# Patient Record
Sex: FEMALE | Race: WHITE
[De-identification: names, ages, dates, MRNs, and addresses within clinical notes are randomized per-mention and may not be internally consistent; named-entity substitution may affect disease eponyms.]

---

## 2018-04-17 ENCOUNTER — HOSPITAL ENCOUNTER (INPATIENT)
Dept: HOSPITAL 26 - MED | Age: 83
LOS: 2 days | Discharge: ANOTHER HOSPITAL | DRG: 871 | End: 2018-04-19
Attending: FAMILY MEDICINE | Admitting: FAMILY MEDICINE
Payer: COMMERCIAL

## 2018-04-17 VITALS — HEIGHT: 65 IN | BODY MASS INDEX: 25.66 KG/M2 | WEIGHT: 154 LBS

## 2018-04-17 VITALS — DIASTOLIC BLOOD PRESSURE: 55 MMHG | SYSTOLIC BLOOD PRESSURE: 111 MMHG

## 2018-04-17 VITALS — DIASTOLIC BLOOD PRESSURE: 49 MMHG | SYSTOLIC BLOOD PRESSURE: 112 MMHG

## 2018-04-17 DIAGNOSIS — J96.01: ICD-10-CM

## 2018-04-17 DIAGNOSIS — Z88.0: ICD-10-CM

## 2018-04-17 DIAGNOSIS — Z96.652: ICD-10-CM

## 2018-04-17 DIAGNOSIS — H54.8: ICD-10-CM

## 2018-04-17 DIAGNOSIS — E43: ICD-10-CM

## 2018-04-17 DIAGNOSIS — E11.69: ICD-10-CM

## 2018-04-17 DIAGNOSIS — E83.42: ICD-10-CM

## 2018-04-17 DIAGNOSIS — E11.319: ICD-10-CM

## 2018-04-17 DIAGNOSIS — D53.9: ICD-10-CM

## 2018-04-17 DIAGNOSIS — Z79.84: ICD-10-CM

## 2018-04-17 DIAGNOSIS — R65.20: ICD-10-CM

## 2018-04-17 DIAGNOSIS — E87.1: ICD-10-CM

## 2018-04-17 DIAGNOSIS — H91.90: ICD-10-CM

## 2018-04-17 DIAGNOSIS — N17.0: ICD-10-CM

## 2018-04-17 DIAGNOSIS — K80.50: ICD-10-CM

## 2018-04-17 DIAGNOSIS — Z87.440: ICD-10-CM

## 2018-04-17 DIAGNOSIS — J40: ICD-10-CM

## 2018-04-17 DIAGNOSIS — D68.59: ICD-10-CM

## 2018-04-17 DIAGNOSIS — A41.9: Primary | ICD-10-CM

## 2018-04-17 DIAGNOSIS — J44.9: ICD-10-CM

## 2018-04-17 DIAGNOSIS — K85.90: ICD-10-CM

## 2018-04-17 DIAGNOSIS — K21.9: ICD-10-CM

## 2018-04-17 DIAGNOSIS — E11.65: ICD-10-CM

## 2018-04-17 DIAGNOSIS — E78.5: ICD-10-CM

## 2018-04-17 DIAGNOSIS — E11.51: ICD-10-CM

## 2018-04-17 DIAGNOSIS — E86.0: ICD-10-CM

## 2018-04-17 DIAGNOSIS — J18.9: ICD-10-CM

## 2018-04-17 DIAGNOSIS — Z79.899: ICD-10-CM

## 2018-04-17 DIAGNOSIS — E02: ICD-10-CM

## 2018-04-17 DIAGNOSIS — E78.2: ICD-10-CM

## 2018-04-17 DIAGNOSIS — I10: ICD-10-CM

## 2018-04-17 LAB
ALBUMIN FLD-MCNC: 2.5 G/DL (ref 3.4–5)
AMYLASE SERPL-CCNC: 751 U/L (ref 25–115)
ANION GAP SERPL CALCULATED.3IONS-SCNC: 16.5 MMOL/L (ref 8–16)
AST SERPL-CCNC: 169 U/L (ref 15–37)
BILIRUB SERPL-MCNC: 10 MG/DL (ref 0–1)
BUN SERPL-MCNC: 33 MG/DL (ref 7–18)
CHLORIDE SERPL-SCNC: 96 MMOL/L (ref 98–107)
CHOLEST/HDLC SERPL: 13.4 {RATIO} (ref 1–4.5)
CO2 SERPL-SCNC: 23.2 MMOL/L (ref 21–32)
CREAT SERPL-MCNC: 1.3 MG/DL (ref 0.6–1.3)
ERYTHROCYTE [DISTWIDTH] IN BLOOD BY AUTOMATED COUNT: 15.1 % (ref 11.6–13.7)
GFR SERPL CREATININE-BSD FRML MDRD: (no result) ML/MIN (ref 90–?)
GLUCOSE SERPL-MCNC: 288 MG/DL (ref 74–106)
HCT VFR BLD AUTO: 31.5 % (ref 36–48)
HDLC SERPL-MCNC: 10 MG/DL (ref 40–60)
HGB BLD-MCNC: 10.9 G/DL (ref 12–16)
LDLC SERPL CALC-MCNC: 105 MG/DL (ref 60–100)
LIPASE SERPL-CCNC: 5678 U/L (ref 73–393)
LYMPHOCYTES NFR BLD MANUAL: 3 % (ref 20–46)
MAGNESIUM SERPL-MCNC: 1.9 MG/DL (ref 1.8–2.4)
MCH RBC QN AUTO: 36 PG (ref 27–31)
MCHC RBC AUTO-ENTMCNC: 35 G/DL (ref 33–37)
MCV RBC AUTO: 102.7 FL (ref 80–94)
MONOCYTES NFR BLD MANUAL: 7 % (ref 5–12)
PHOSPHATE SERPL-MCNC: 2.2 MG/DL (ref 2.5–4.9)
PLATELET # BLD AUTO: 201 K/UL (ref 140–450)
POTASSIUM SERPL-SCNC: 4.7 MMOL/L (ref 3.5–5.1)
PROTHROMBIN TIME: 13.3 SECS (ref 10.8–13.4)
RBC # BLD AUTO: 3.06 MIL/UL (ref 4.2–5.4)
SODIUM SERPL-SCNC: 131 MMOL/L (ref 136–145)
T4 FREE SERPL-MCNC: 1.35 NG/DL (ref 0.76–1.46)
TRIGL SERPL-MCNC: 98 MG/DL (ref 30–150)
TSH SERPL DL<=0.05 MIU/L-ACNC: 6.87 UIU/ML (ref 0.34–3.74)
WBC # BLD AUTO: 20.2 K/UL (ref 4.8–10.8)

## 2018-04-17 RX ADMIN — METRONIDAZOLE SCH MLS/HR: 500 SOLUTION INTRAVENOUS at 23:08

## 2018-04-18 VITALS — DIASTOLIC BLOOD PRESSURE: 34 MMHG | SYSTOLIC BLOOD PRESSURE: 105 MMHG

## 2018-04-18 VITALS — DIASTOLIC BLOOD PRESSURE: 39 MMHG | SYSTOLIC BLOOD PRESSURE: 98 MMHG

## 2018-04-18 VITALS — SYSTOLIC BLOOD PRESSURE: 110 MMHG | DIASTOLIC BLOOD PRESSURE: 54 MMHG

## 2018-04-18 VITALS — SYSTOLIC BLOOD PRESSURE: 104 MMHG | DIASTOLIC BLOOD PRESSURE: 36 MMHG

## 2018-04-18 VITALS — DIASTOLIC BLOOD PRESSURE: 52 MMHG | SYSTOLIC BLOOD PRESSURE: 116 MMHG

## 2018-04-18 VITALS — DIASTOLIC BLOOD PRESSURE: 43 MMHG | SYSTOLIC BLOOD PRESSURE: 103 MMHG

## 2018-04-18 LAB
ANION GAP SERPL CALCULATED.3IONS-SCNC: 12.2 MMOL/L (ref 8–16)
APPEARANCE UR: (no result)
BILIRUB UR QL STRIP: (no result)
BUN SERPL-MCNC: 24 MG/DL (ref 7–18)
CHLORIDE SERPL-SCNC: 102 MMOL/L (ref 98–107)
CO2 SERPL-SCNC: 23 MMOL/L (ref 21–32)
COLOR UR: (no result)
CREAT SERPL-MCNC: 1 MG/DL (ref 0.6–1.3)
ERYTHROCYTE [DISTWIDTH] IN BLOOD BY AUTOMATED COUNT: 14.7 % (ref 11.6–13.7)
GFR SERPL CREATININE-BSD FRML MDRD: (no result) ML/MIN (ref 90–?)
GLUCOSE SERPL-MCNC: 242 MG/DL (ref 74–106)
GLUCOSE UR STRIP-MCNC: NEGATIVE MG/DL
HCT VFR BLD AUTO: 28.3 % (ref 36–48)
HGB BLD-MCNC: 9.6 G/DL (ref 12–16)
HGB UR QL STRIP: (no result)
IRON SERPL-MCNC: 17 UG/DL (ref 35–150)
LEUKOCYTE ESTERASE UR QL STRIP: NEGATIVE
LYMPHOCYTES NFR BLD MANUAL: 4 % (ref 20–46)
MAGNESIUM SERPL-MCNC: 1.7 MG/DL (ref 1.8–2.4)
MCH RBC QN AUTO: 35 PG (ref 27–31)
MCHC RBC AUTO-ENTMCNC: 34 G/DL (ref 33–37)
MCV RBC AUTO: 103.8 FL (ref 80–94)
MONOCYTES NFR BLD MANUAL: 8 % (ref 5–12)
NITRITE UR QL STRIP: NEGATIVE
PH UR STRIP: 5.5 [PH] (ref 5–9)
PHOSPHATE SERPL-MCNC: 2.9 MG/DL (ref 2.5–4.9)
PLATELET # BLD AUTO: 194 K/UL (ref 140–450)
POTASSIUM SERPL-SCNC: 4.2 MMOL/L (ref 3.5–5.1)
RBC # BLD AUTO: 2.73 MIL/UL (ref 4.2–5.4)
RBC #/AREA URNS HPF: (no result) /HPF (ref 0–5)
SODIUM SERPL-SCNC: 133 MMOL/L (ref 136–145)
TIBC SERPL-MCNC: 148 UG/DL (ref 250–450)
WBC # BLD AUTO: 20.6 K/UL (ref 4.8–10.8)
WBC,URINE: (no result) /HPF (ref 0–5)

## 2018-04-18 RX ADMIN — Medication SCH DEV: at 06:31

## 2018-04-18 RX ADMIN — SODIUM CHLORIDE SCH MLS/HR: 9 INJECTION, SOLUTION INTRAVENOUS at 00:19

## 2018-04-18 RX ADMIN — IPRATROPIUM BROMIDE AND ALBUTEROL SULFATE SCH ML: .5; 3 SOLUTION RESPIRATORY (INHALATION) at 20:32

## 2018-04-18 RX ADMIN — Medication SCH DEV: at 21:14

## 2018-04-18 RX ADMIN — Medication SCH EACH: at 08:31

## 2018-04-18 RX ADMIN — Medication SCH DEV: at 17:15

## 2018-04-18 RX ADMIN — Medication SCH DEV: at 12:04

## 2018-04-18 RX ADMIN — IPRATROPIUM BROMIDE AND ALBUTEROL SULFATE SCH ML: .5; 3 SOLUTION RESPIRATORY (INHALATION) at 01:30

## 2018-04-18 RX ADMIN — IPRATROPIUM BROMIDE AND ALBUTEROL SULFATE SCH ML: .5; 3 SOLUTION RESPIRATORY (INHALATION) at 14:36

## 2018-04-18 RX ADMIN — SODIUM CHLORIDE SCH MLS/HR: 9 INJECTION, SOLUTION INTRAVENOUS at 20:00

## 2018-04-18 RX ADMIN — PANTOPRAZOLE SODIUM SCH MG: 40 TABLET, DELAYED RELEASE ORAL at 08:31

## 2018-04-18 RX ADMIN — METRONIDAZOLE SCH MLS/HR: 500 SOLUTION INTRAVENOUS at 04:21

## 2018-04-18 RX ADMIN — SODIUM CHLORIDE SCH MLS/HR: 9 INJECTION, SOLUTION INTRAVENOUS at 21:08

## 2018-04-18 RX ADMIN — SODIUM CHLORIDE SCH MLS/HR: 9 INJECTION, SOLUTION INTRAVENOUS at 08:41

## 2018-04-18 RX ADMIN — METRONIDAZOLE SCH MLS/HR: 500 SOLUTION INTRAVENOUS at 12:27

## 2018-04-18 RX ADMIN — METRONIDAZOLE SCH MLS/HR: 500 SOLUTION INTRAVENOUS at 21:14

## 2018-04-18 RX ADMIN — DOCUSATE SODIUM SCH MG: 100 CAPSULE, LIQUID FILLED ORAL at 21:14

## 2018-04-18 RX ADMIN — DOCUSATE SODIUM SCH MG: 100 CAPSULE, LIQUID FILLED ORAL at 08:31

## 2018-04-18 RX ADMIN — IPRATROPIUM BROMIDE AND ALBUTEROL SULFATE SCH ML: .5; 3 SOLUTION RESPIRATORY (INHALATION) at 06:59

## 2018-04-19 VITALS — DIASTOLIC BLOOD PRESSURE: 52 MMHG | SYSTOLIC BLOOD PRESSURE: 121 MMHG

## 2018-04-19 VITALS — DIASTOLIC BLOOD PRESSURE: 39 MMHG | SYSTOLIC BLOOD PRESSURE: 97 MMHG

## 2018-04-19 VITALS — SYSTOLIC BLOOD PRESSURE: 129 MMHG | DIASTOLIC BLOOD PRESSURE: 44 MMHG

## 2018-04-19 VITALS — SYSTOLIC BLOOD PRESSURE: 108 MMHG | DIASTOLIC BLOOD PRESSURE: 42 MMHG

## 2018-04-19 VITALS — SYSTOLIC BLOOD PRESSURE: 79 MMHG | DIASTOLIC BLOOD PRESSURE: 37 MMHG

## 2018-04-19 VITALS — SYSTOLIC BLOOD PRESSURE: 90 MMHG | DIASTOLIC BLOOD PRESSURE: 37 MMHG

## 2018-04-19 LAB
ALBUMIN FLD-MCNC: 1.5 G/DL (ref 3.4–5)
ANION GAP SERPL CALCULATED.3IONS-SCNC: 12.8 MMOL/L (ref 8–16)
AST SERPL-CCNC: 132 U/L (ref 15–37)
BASOPHILS # BLD AUTO: 0 K/UL (ref 0–0.22)
BASOPHILS NFR BLD AUTO: 0.2 % (ref 0–2)
BILIRUB DIRECT SERPL-MCNC: 7.6 MG/DL (ref 0–0.3)
BILIRUB SERPL-MCNC: 8.5 MG/DL (ref 0–1)
BUN SERPL-MCNC: 27 MG/DL (ref 7–18)
CHLORIDE SERPL-SCNC: 107 MMOL/L (ref 98–107)
CO2 SERPL-SCNC: 20 MMOL/L (ref 21–32)
CREAT SERPL-MCNC: 1.3 MG/DL (ref 0.6–1.3)
EOSINOPHIL # BLD AUTO: 0 K/UL (ref 0–0.4)
EOSINOPHIL NFR BLD AUTO: 0 % (ref 0–4)
ERYTHROCYTE [DISTWIDTH] IN BLOOD BY AUTOMATED COUNT: 15.5 % (ref 11.6–13.7)
FERRITIN SERPL-MCNC: 520 NG/ML (ref 15–150)
FOLATE SERPL-MCNC: 7.7 NG/ML (ref 3–?)
GFR SERPL CREATININE-BSD FRML MDRD: (no result) ML/MIN (ref 90–?)
GLUCOSE SERPL-MCNC: 111 MG/DL (ref 74–106)
HCT VFR BLD AUTO: 24.5 % (ref 36–48)
HGB BLD-MCNC: 8.3 G/DL (ref 12–16)
LIPASE SERPL-CCNC: 1091 U/L (ref 73–393)
LYMPHOCYTES # BLD AUTO: 0.6 K/UL (ref 2.5–16.5)
LYMPHOCYTES NFR BLD AUTO: 3.7 % (ref 20.5–51.1)
MAGNESIUM SERPL-MCNC: 1.7 MG/DL (ref 1.8–2.4)
MCH RBC QN AUTO: 36 PG (ref 27–31)
MCHC RBC AUTO-ENTMCNC: 34 G/DL (ref 33–37)
MCV RBC AUTO: 105.5 FL (ref 80–94)
MONOCYTES # BLD AUTO: 1.2 K/UL (ref 0.8–1)
MONOCYTES NFR BLD AUTO: 6.6 % (ref 1.7–9.3)
NEUTROPHILS # BLD AUTO: 15.7 K/UL (ref 1.8–7.7)
NEUTROPHILS NFR BLD AUTO: 89.5 % (ref 42.2–75.2)
PHOSPHATE SERPL-MCNC: 2.8 MG/DL (ref 2.5–4.9)
PLATELET # BLD AUTO: 168 K/UL (ref 140–450)
POTASSIUM SERPL-SCNC: 3.8 MMOL/L (ref 3.5–5.1)
RBC # BLD AUTO: 2.32 MIL/UL (ref 4.2–5.4)
SODIUM SERPL-SCNC: 136 MMOL/L (ref 136–145)
TRANSFERRIN SERPL-MCNC: 121 MG/DL (ref 200–370)
VIT B12 SERPL-MCNC: 1275 PG/ML (ref 211–946)
WBC # BLD AUTO: 17.5 K/UL (ref 4.8–10.8)

## 2018-04-19 RX ADMIN — SODIUM CHLORIDE SCH MLS/HR: 9 INJECTION, SOLUTION INTRAVENOUS at 05:43

## 2018-04-19 RX ADMIN — Medication SCH DEV: at 06:34

## 2018-04-19 RX ADMIN — Medication SCH DEV: at 16:30

## 2018-04-19 RX ADMIN — SODIUM CHLORIDE SCH MLS/HR: 9 INJECTION, SOLUTION INTRAVENOUS at 16:00

## 2018-04-19 RX ADMIN — Medication SCH DEV: at 11:30

## 2018-04-19 RX ADMIN — IPRATROPIUM BROMIDE AND ALBUTEROL SULFATE SCH ML: .5; 3 SOLUTION RESPIRATORY (INHALATION) at 18:00

## 2018-04-19 RX ADMIN — METRONIDAZOLE SCH MLS/HR: 500 SOLUTION INTRAVENOUS at 17:05

## 2018-04-19 RX ADMIN — Medication SCH EACH: at 09:11

## 2018-04-19 RX ADMIN — IPRATROPIUM BROMIDE AND ALBUTEROL SULFATE SCH ML: .5; 3 SOLUTION RESPIRATORY (INHALATION) at 13:33

## 2018-04-19 RX ADMIN — PANTOPRAZOLE SODIUM SCH MG: 40 TABLET, DELAYED RELEASE ORAL at 09:11

## 2018-04-19 RX ADMIN — METRONIDAZOLE SCH MLS/HR: 500 SOLUTION INTRAVENOUS at 05:07

## 2018-04-19 RX ADMIN — IPRATROPIUM BROMIDE AND ALBUTEROL SULFATE SCH ML: .5; 3 SOLUTION RESPIRATORY (INHALATION) at 02:03

## 2018-04-19 RX ADMIN — DOCUSATE SODIUM SCH MG: 100 CAPSULE, LIQUID FILLED ORAL at 09:00

## 2018-04-19 RX ADMIN — IPRATROPIUM BROMIDE AND ALBUTEROL SULFATE SCH ML: .5; 3 SOLUTION RESPIRATORY (INHALATION) at 08:27

## 2019-03-11 ENCOUNTER — HOSPITAL ENCOUNTER (INPATIENT)
Dept: HOSPITAL 26 - MED | Age: 84
LOS: 2 days | Discharge: HOME | DRG: 683 | End: 2019-03-13
Attending: INTERNAL MEDICINE | Admitting: INTERNAL MEDICINE
Payer: COMMERCIAL

## 2019-03-11 VITALS — WEIGHT: 160 LBS | HEIGHT: 61 IN | BODY MASS INDEX: 30.21 KG/M2

## 2019-03-11 VITALS — DIASTOLIC BLOOD PRESSURE: 56 MMHG | SYSTOLIC BLOOD PRESSURE: 129 MMHG

## 2019-03-11 DIAGNOSIS — Z88.0: ICD-10-CM

## 2019-03-11 DIAGNOSIS — N39.0: ICD-10-CM

## 2019-03-11 DIAGNOSIS — N17.9: Primary | ICD-10-CM

## 2019-03-11 DIAGNOSIS — Z79.84: ICD-10-CM

## 2019-03-11 DIAGNOSIS — J45.901: ICD-10-CM

## 2019-03-11 DIAGNOSIS — E11.65: ICD-10-CM

## 2019-03-11 DIAGNOSIS — Z79.899: ICD-10-CM

## 2019-03-11 LAB
ALBUMIN FLD-MCNC: 3.5 G/DL (ref 3.4–5)
ANION GAP SERPL CALCULATED.3IONS-SCNC: 14 MMOL/L (ref 8–16)
APPEARANCE UR: (no result)
AST SERPL-CCNC: 27 U/L (ref 15–37)
BASOPHILS # BLD AUTO: 0 K/UL (ref 0–0.22)
BASOPHILS NFR BLD AUTO: 0.4 % (ref 0–2)
BILIRUB SERPL-MCNC: 0.3 MG/DL (ref 0–1)
BILIRUB UR QL STRIP: NEGATIVE
BUN SERPL-MCNC: 37 MG/DL (ref 7–18)
CHLORIDE SERPL-SCNC: 105 MMOL/L (ref 98–107)
CO2 SERPL-SCNC: 26.2 MMOL/L (ref 21–32)
COLOR UR: YELLOW
CREAT SERPL-MCNC: 1.5 MG/DL (ref 0.6–1.3)
EOSINOPHIL # BLD AUTO: 0.5 K/UL (ref 0–0.4)
EOSINOPHIL NFR BLD AUTO: 8 % (ref 0–4)
ERYTHROCYTE [DISTWIDTH] IN BLOOD BY AUTOMATED COUNT: 13.6 % (ref 11.6–13.7)
GFR SERPL CREATININE-BSD FRML MDRD: (no result) ML/MIN (ref 90–?)
GLUCOSE SERPL-MCNC: 224 MG/DL (ref 74–106)
GLUCOSE UR STRIP-MCNC: NEGATIVE MG/DL
HCT VFR BLD AUTO: 36.6 % (ref 36–48)
HGB BLD-MCNC: 12.1 G/DL (ref 12–16)
HGB UR QL STRIP: (no result)
LEUKOCYTE ESTERASE UR QL STRIP: (no result)
LYMPHOCYTES # BLD AUTO: 1.1 K/UL (ref 2.5–16.5)
LYMPHOCYTES NFR BLD AUTO: 18 % (ref 20.5–51.1)
MCH RBC QN AUTO: 34 PG (ref 27–31)
MCHC RBC AUTO-ENTMCNC: 33 G/DL (ref 33–37)
MCV RBC AUTO: 102.6 FL (ref 80–94)
MONOCYTES # BLD AUTO: 0.8 K/UL (ref 0.8–1)
MONOCYTES NFR BLD AUTO: 12.4 % (ref 1.7–9.3)
NEUTROPHILS # BLD AUTO: 3.8 K/UL (ref 1.8–7.7)
NEUTROPHILS NFR BLD AUTO: 61.2 % (ref 42.2–75.2)
NITRITE UR QL STRIP: NEGATIVE
PH UR STRIP: 5.5 [PH] (ref 5–9)
PLATELET # BLD AUTO: 178 K/UL (ref 140–450)
POTASSIUM SERPL-SCNC: 4.2 MMOL/L (ref 3.5–5.1)
PROTHROMBIN TIME: 10.8 SECS (ref 10.8–13.4)
RBC # BLD AUTO: 3.57 MIL/UL (ref 4.2–5.4)
RBC #/AREA URNS HPF: (no result) /HPF (ref 0–5)
SODIUM SERPL-SCNC: 141 MMOL/L (ref 136–145)
WBC # BLD AUTO: 6.2 K/UL (ref 4.8–10.8)
WBC,URINE: (no result) /HPF (ref 0–5)

## 2019-03-11 NOTE — NUR
PT TO ED WITH C/O COUGH AND REPORTED FEVER X 1 MONTH. PT REPORTS BEING SEEN BY 
PRIMARY MD AND GIVEN ABX WITH NO RELIEF OF SMYMPTOMS. PT DENIES CP/SOB. LUNG 
SOUNDS CLEAR TO ASCULTATION. NO S/S OF DISTRESS NOTED. PT PLACED INTO BED, 
PENDING MD SHAVER.

## 2019-03-12 VITALS — SYSTOLIC BLOOD PRESSURE: 115 MMHG | DIASTOLIC BLOOD PRESSURE: 42 MMHG

## 2019-03-12 VITALS — SYSTOLIC BLOOD PRESSURE: 128 MMHG | DIASTOLIC BLOOD PRESSURE: 48 MMHG

## 2019-03-12 VITALS — DIASTOLIC BLOOD PRESSURE: 44 MMHG | SYSTOLIC BLOOD PRESSURE: 118 MMHG

## 2019-03-12 VITALS — DIASTOLIC BLOOD PRESSURE: 45 MMHG | SYSTOLIC BLOOD PRESSURE: 132 MMHG

## 2019-03-12 RX ADMIN — SODIUM CHLORIDE SCH MLS/HR: 9 INJECTION, SOLUTION INTRAVENOUS at 03:30

## 2019-03-12 RX ADMIN — Medication SCH DEV: at 06:03

## 2019-03-12 RX ADMIN — Medication SCH DEV: at 21:36

## 2019-03-12 RX ADMIN — INSULIN LISPRO PRN UNITS: 100 INJECTION, SOLUTION INTRAVENOUS; SUBCUTANEOUS at 13:09

## 2019-03-12 RX ADMIN — ENOXAPARIN SODIUM SCH MG: 30 INJECTION SUBCUTANEOUS at 09:20

## 2019-03-12 RX ADMIN — INSULIN LISPRO PRN UNITS: 100 INJECTION, SOLUTION INTRAVENOUS; SUBCUTANEOUS at 21:43

## 2019-03-12 RX ADMIN — SODIUM CHLORIDE SCH MLS/HR: 9 INJECTION, SOLUTION INTRAVENOUS at 11:35

## 2019-03-12 RX ADMIN — Medication SCH DEV: at 11:30

## 2019-03-12 RX ADMIN — SODIUM CHLORIDE SCH MLS/HR: 9 INJECTION, SOLUTION INTRAVENOUS at 21:35

## 2019-03-12 RX ADMIN — Medication SCH DEV: at 16:30

## 2019-03-12 NOTE — NUR
WAS CALLED THE THE ROOM BY THE SON, AD MINA.    HE WANTED TO LET US KNOW THAT HE WAS 
APPLYING FOR IHSS FOR THIS PATIENT.

## 2019-03-12 NOTE — NUR
PATIENT REQUESTING PAIN MEDS. SHES COUGHING AND ITS CAUSING UPPER CHEST PAIN. WILL CONTINUE 
TO MONITOR

## 2019-03-12 NOTE — NUR
RECEIVED PT FROM LEELA RN PT Maori SPEAKER AAOX4 LEGALLY BLIND IV ON LEFT ARM  INFUSING 
WELL PT ALSO S ASSISTED TO THE RESTROOM INITIAL ASSESSMENTDONE

## 2019-03-12 NOTE — NUR
AMBULATED PATIENT TO BATHROOM AND BACK TO BED. PATIENT RESTING IN BED. SON AT BEDSIDE. PT. 
HAS DRY COUGH, LUNGS CLEAR. NO SIGNS OF DISTRESS.

## 2019-03-12 NOTE — NUR
Patient will be admitted to care of DR MERCHANT. Admited to M/S.  Will go to room 
123-A. Belongings list completed.  Report to STEPHANIE CHEN.

## 2019-03-12 NOTE — NUR
RECEIVED REPORT FROM CHARGE RN FOR CONTINUITY OF CARE. PT IS A/OX4, ON ROOM AIR AND SPEAKS 
Armenian ONLY. PT ABLE TO MAKE NEEDS KNOWN, AND ABLE TO FOLLOW COMMANDS. PT AMBULATES WITH 
ASSISTANCE. PT SKIN IS INTACT. PT HAS A 22G IV TO LEFT HAND, ASYMPTOMATIC AND INTACT. VITAL 
SIGNS WITHIN NORMAL LIMITS. PT STABLE, DENIES PAIN, NO SIGNS OF DISTRESS NOTED AT THIS TIME. 
PT POSITIONED FOR COMFORT. BED IN LOWEST POSITION, BED ALARM ON. WILL CONTINUE TO MONITOR.

## 2019-03-12 NOTE — NUR
RECEIVED BEDSIDE REPORT FROM NIGHT SHIFT NURSE. PATIENT IS AWAKE, ALERT AND ORIENTEDX4. 
Bruneian SPEAKER. NO SIGNS OF DISTRESS ON RA. SKIN INTACT. FALL RISK PROTOCOL IN PLACE. 
PATIENT IS LEGALLY BLIND. WALKER AT BEDSIDE. L HAND 22G INFUSING NS . CLEAN, DRY AND 
INTACT. BED IN LOW POSITION, CALL LIGHT WITHIN REACH. PATIENT IS CONTINENT. WILL CONTINUE TO 
MONITOR

## 2019-03-12 NOTE — NUR
PER REQUEST OF BAO CERDA I CALLED [employer] TO FIND OUT IF PATIENT HAD HOME HEALTH.   
SPOKE WITH CORRIE AND SHE SAID THE PATIENT HAD [facility] BACK IN AUGUST, 2018   
[employer] [phone] . FOR CORRIE.

## 2019-03-13 VITALS — SYSTOLIC BLOOD PRESSURE: 121 MMHG | DIASTOLIC BLOOD PRESSURE: 38 MMHG

## 2019-03-13 VITALS — SYSTOLIC BLOOD PRESSURE: 100 MMHG | DIASTOLIC BLOOD PRESSURE: 50 MMHG

## 2019-03-13 LAB
ANION GAP SERPL CALCULATED.3IONS-SCNC: 10.1 MMOL/L (ref 8–16)
BASOPHILS # BLD AUTO: 0 K/UL (ref 0–0.22)
BASOPHILS NFR BLD AUTO: 0.6 % (ref 0–2)
BUN SERPL-MCNC: 20 MG/DL (ref 7–18)
CHLORIDE SERPL-SCNC: 107 MMOL/L (ref 98–107)
CO2 SERPL-SCNC: 25.8 MMOL/L (ref 21–32)
CREAT SERPL-MCNC: 0.8 MG/DL (ref 0.6–1.3)
EOSINOPHIL # BLD AUTO: 0.4 K/UL (ref 0–0.4)
EOSINOPHIL NFR BLD AUTO: 8 % (ref 0–4)
ERYTHROCYTE [DISTWIDTH] IN BLOOD BY AUTOMATED COUNT: 13.5 % (ref 11.6–13.7)
GFR SERPL CREATININE-BSD FRML MDRD: (no result) ML/MIN (ref 90–?)
GLUCOSE SERPL-MCNC: 124 MG/DL (ref 74–106)
HCT VFR BLD AUTO: 32.6 % (ref 36–48)
HGB BLD-MCNC: 10.9 G/DL (ref 12–16)
LYMPHOCYTES # BLD AUTO: 1.9 K/UL (ref 2.5–16.5)
LYMPHOCYTES NFR BLD AUTO: 37.4 % (ref 20.5–51.1)
MCH RBC QN AUTO: 34 PG (ref 27–31)
MCHC RBC AUTO-ENTMCNC: 34 G/DL (ref 33–37)
MCV RBC AUTO: 102.6 FL (ref 80–94)
MONOCYTES # BLD AUTO: 1 K/UL (ref 0.8–1)
MONOCYTES NFR BLD AUTO: 19.8 % (ref 1.7–9.3)
NEUTROPHILS # BLD AUTO: 1.7 K/UL (ref 1.8–7.7)
NEUTROPHILS NFR BLD AUTO: 34.2 % (ref 42.2–75.2)
PLATELET # BLD AUTO: 163 K/UL (ref 140–450)
POTASSIUM SERPL-SCNC: 3.9 MMOL/L (ref 3.5–5.1)
RBC # BLD AUTO: 3.18 MIL/UL (ref 4.2–5.4)
SODIUM SERPL-SCNC: 139 MMOL/L (ref 136–145)
WBC # BLD AUTO: 5.1 K/UL (ref 4.8–10.8)

## 2019-03-13 RX ADMIN — Medication SCH DEV: at 11:30

## 2019-03-13 RX ADMIN — ENOXAPARIN SODIUM SCH MG: 30 INJECTION SUBCUTANEOUS at 09:34

## 2019-03-13 RX ADMIN — Medication SCH DEV: at 06:15

## 2019-03-13 RX ADMIN — SODIUM CHLORIDE SCH MLS/HR: 9 INJECTION, SOLUTION INTRAVENOUS at 07:46

## 2019-03-13 RX ADMIN — Medication SCH DEV: at 16:59

## 2019-03-13 NOTE — NUR
RECEIVED BEDSIDE REPORT FROM NIGHT SHIFT NURSE. PATIENT IS AWAKE, ALERT AND ORIENTEDX4. NO 
SIGNS OF DISTRESS ON RA. Greek SPEAKER. SKIN IS INTACT. IV ON L HAND 22G INFUSING NS AT 
100. CLEAN, DRY AND INTACT. PATIENT AMBULATES BUT IS BLIND. WALKER AT BEDSIDE. AMBULATE W 
ASSIST. FALL RISK PROTOCOL IN PLACE. PATIENT IS CONTINENT. BEDSIDE COMMODE AT BEDSIDE. BED 
IN LOW POSITION, CALL LIGHT WITHIN REACH. WILL CONTINUE TO MONITOR THE PATIENT

## 2019-03-13 NOTE — NUR
EDUCATED PATIENT AND DAUGHTER ON DISEASE PROCESS, ABN S/SX, WHEN TO GO TO THE ER, EDUCATED 
ON F/U W PCP IN 1-2 WEEKS, EDUCATED ON MEDS AND GAVE PRESCRIPTION. PATIENT AND DAUGHTER 
VERBALIZED UNDERSTANDING. DAUGHTER SIGNED PAPERWORK, PATIENT IS BLIND. ID BANDS REMOVED. 
REMOVED IV. TIP INTACT.

## 2019-03-13 NOTE — NUR
ADMINISTERED MEDICATION TO PATIENT. TOLERATED WELL. RESTING IN BEST NO SIGNS OF DISTRESS ON 
RA. CALL LIGHT WITHIN REACH, BED IN LOW POSITION.

## 2019-08-31 NOTE — NUR
DISCHARGED STABLE. PRESCRIPTION,COPY OF LABS,VERBAL AND WRITTEN AFTERCARE 
INSTRUCTIONS GIVEN TO PATIENT AND FAMILY. VERBALIZED UNDERSTANDING.

## 2019-08-31 NOTE — NUR
83/F BIB FAMILY, C/O DIARRHEA (X10 EPISODSES TONIGHT) AND MIDABD PAIN RADIATING 
DIFFUSELY, X5 DAYS. DENIES FEVER, N/V. PT AWAKE AND ALERT, BL BLINDNESS NOTED, 
SKIN LOOSE NORMAL COLOR WARM AND DRY, RR EVEN AND UNLABORED. LUNG SOUNDS CLEAR 
BL. HR EVEN AND REGULAR, NSR ON MONITOR. BS ACTIVE X4, ABD SOFT FLAT TENDER 
DIFFUSELY. 

HX DM, BLINDNESS, ASTHMA

RX METFORMIN, ALBUTEROL INH

## 2020-09-08 NOTE — NUR
Patient discharged with v/s stable. Written and verbal after care instructions 
given and explained regarding hyperglycemia and uti. 

Patient alert, oriented and verbalized understanding of instructions. 
Ambulatory with steady gait. All questions addressed prior to discharge. ID 
band removed. Patient advised to follow up with PMD. Rx of metformin and 
macrobid given. Patient educated on indication of medication including possible 
reaction and side effects. Opportunity to ask questions provided and answered.

## 2020-09-08 NOTE — NUR
C/O COUGH X 2 MONTHS, BLOODY STOOL, INTERMITENT ABDOMINAL PAIN  X 2 WEEKS. 
BLOOD SUGAR 407 AT THIS TIME.PT AOX4 , AFIBRILE , AMBULATORY WITH STEADY 
GAIT,UNABLE TO SEE, PINK PALPEBRAL CONJUNCTIVA , ANICTERIC SCLERA , SCE , ROUND 
SOFT ABDOMEN.

MED HX: DM, ASTHMA,COPD,GERD

## 2021-10-19 NOTE — NUR
Went to check on patient, introduced myself as oncoming primary RN. Pt is 
primarily Puerto Rican speaking. Patient explains she came in to the ED for 
abdominal pain. Pt was previously medicated with Norco which she reports is 
helping with her pain. Consent signed by patient for CT scan. Copy placed in 
chart. Vital signs updated. Pt placed in position of comfort, blankets 
repositioned for comfort. All questions answered prior to leaving the room.

## 2021-10-19 NOTE — NUR
SPOKE WITH RUDY (DAUGHTER) WHO LIVES 15 MINUTES AWAY. DAUGHTER WAITING IN LOBBY 
STATES SHE WILL GO HOME FOR NOW AND REQUESTED TO CONTACT HER FOR TRANSPORTATION 
UPON DISCHARGE.

## 2021-10-19 NOTE — NUR
84 Y/O F, PT PRESENTS TO ED WITH ABD PAIN, BLOOD IN STOOL, DIARRHEA, NAUSEA 
WITH DYSURIA AND URINARY RETENTION FOR 8 DAYS. PT STATED 8 DAYS AGO SHE FELL 
FROM BED ON L SIDE OF BODY, DENIES LOC, SYNCOPE OR HEAD/NECK INJURY. STATES 
AFTER SHE FELL SHE STARTED HAVING INTENSE ABD PAIN THAT RADIATES TO SACRAL 
AREA. PT BASELINE IS A&OX4, AMBULATES WITH WALKER, PT IS BLIND NEEDS ASSISTANCE 
WITH AMBULATING. PATIENT REPORTS GENERALIZED ABDOMINAL PAIN 10/10, 
THROBBING/ACHING/CONSTANT PAIN. PATIENT STATES RIGHT HIP PAIN AS WELL. DENIES 
CHEST PAIN, DIZZINESS, HEADACHE, BLURRY VISION, SOB. PT PLACED INTO GOWN AND 
CARDIAC MONITOR. MID ABDOMEN TENDER TO PALPATION. PATIENT REPORTS TYLENOL PRIOR 
TO ARRIVAL WITH MINOR RELIEF. VSS; RESPIRATIONS EVEN/UNLABORED. BED LOCKED IN 
LOWEST POSITION, SIDE RAILS X 1, CALL LIGHT IN REACH. 



PMH: DM2, ASTHMA, BLIND

MED: DENIES

ALLERGY: PENICILLIN

## 2021-10-20 NOTE — NUR
Patient discharged with v/s stable. Written and verbal after care instructions 
given about fall, degenerative disk disease and abdominal pain and explained in 
Macedonian by myself. Patient alert, oriented and verbalized understanding of 
instructions. Ambulatory with use of front wheel walker and steady gait. All 
questions addressed prior to discharge. ID band removed. Patient advised to 
follow up with PMD. Rx of Meloxicam and Lidoderm 5% patch given. Patient 
educated on indication of medication including possible reaction and side 
effects. Opportunity to ask questions provided and answered.

## 2021-10-20 NOTE — NUR
Pt returned from CT scan and placed onto cardiac monitor. Pt reports feeling 
improvement of pain. All of her needs were addressed at this time. Pt waiting 
for results. All questions were answered prior to leaving the room. Call light 
left within reach.

## 2023-02-19 NOTE — NUR
Patient discharged with v/s stable. Written and verbal after care instructions 
given and explained. 

Patient alert, oriented and verbalized understanding of instructions. 
Ambulatory with steady gait. All questions addressed prior to discharge. ID 
band removed. Patient advised to follow up with PMD. Rx of IBUPROFEN (SENT) 
given. Patient educated on indication of medication including possible reaction 
and side effects. Opportunity to ask questions provided and answered.

## 2023-02-19 NOTE — NUR
86 Y/O FEMALE BIB DAUGHTER C/O DIZZINESS, HA, NECK PAINX3 DAYS, DENIES ANY CP, 
ABD PAIN, NV.  AT THIS TIME. TOOK MOTRIN WITH MODERATE EFFECT. DENIES ANY 
FALLS, RECENT TRAUMA/INJURY. AMBULATORY WITH WALKER, SKIN IS WARM TO TOUCH. 
VERBALLY RESPONSIVE, A/OX4





ALLERGY: PCN

PMH: DM, ASTHMA, LEGALLY BLIND